# Patient Record
Sex: MALE | Race: WHITE | URBAN - NONMETROPOLITAN AREA
[De-identification: names, ages, dates, MRNs, and addresses within clinical notes are randomized per-mention and may not be internally consistent; named-entity substitution may affect disease eponyms.]

---

## 2020-08-13 ENCOUNTER — NURSE ONLY (OUTPATIENT)
Dept: LAB | Age: 75
End: 2020-08-13

## 2020-09-01 LAB — MISC REFERENCE: NORMAL

## 2022-09-07 ENCOUNTER — TELEPHONE (OUTPATIENT)
Dept: INFECTIOUS DISEASES | Age: 77
End: 2022-09-07

## 2022-09-07 NOTE — TELEPHONE ENCOUNTER
Called patient to schedule appointment for [facility] per referral.  Patient daughter answered the phone. Appointment was scheduled for Monday, October 3.

## 2022-09-30 ENCOUNTER — TELEPHONE (OUTPATIENT)
Dept: INFECTIOUS DISEASES | Age: 77
End: 2022-09-30

## 2022-09-30 NOTE — TELEPHONE ENCOUNTER
Appointment reminder call, left message with daughter who answered phone. Daughter stated that her dad was admitted to hospital with Dr. Chio Warner doing his care. She states her dad will continue with Dr. Chio Warner for now and will call clinic if anything changes.

## 2024-08-27 ENCOUNTER — TELEPHONE (OUTPATIENT)
Dept: RHEUMATOLOGY | Age: 79
End: 2024-08-27

## 2024-08-27 NOTE — TELEPHONE ENCOUNTER
Patient left a voicemail stating she is needing to r/s her father's missed appt. Attempted to contact the pt, no answer. Left message for her to contact the clinic